# Patient Record
Sex: FEMALE | Race: AMERICAN INDIAN OR ALASKA NATIVE
[De-identification: names, ages, dates, MRNs, and addresses within clinical notes are randomized per-mention and may not be internally consistent; named-entity substitution may affect disease eponyms.]

---

## 2021-04-21 ENCOUNTER — HOSPITAL ENCOUNTER (EMERGENCY)
Dept: HOSPITAL 5 - ED | Age: 66
Discharge: HOME | End: 2021-04-21
Payer: MEDICARE

## 2021-04-21 VITALS — SYSTOLIC BLOOD PRESSURE: 120 MMHG | DIASTOLIC BLOOD PRESSURE: 72 MMHG

## 2021-04-21 DIAGNOSIS — Z79.899: ICD-10-CM

## 2021-04-21 DIAGNOSIS — E11.9: ICD-10-CM

## 2021-04-21 DIAGNOSIS — I10: ICD-10-CM

## 2021-04-21 DIAGNOSIS — M19.90: ICD-10-CM

## 2021-04-21 DIAGNOSIS — K21.00: Primary | ICD-10-CM

## 2021-04-21 DIAGNOSIS — Z98.890: ICD-10-CM

## 2021-04-21 DIAGNOSIS — Z87.891: ICD-10-CM

## 2021-04-21 DIAGNOSIS — R07.89: ICD-10-CM

## 2021-04-21 DIAGNOSIS — K04.7: ICD-10-CM

## 2021-04-21 LAB
ALBUMIN SERPL-MCNC: 3.6 G/DL (ref 3.9–5)
ALT SERPL-CCNC: 23 UNITS/L (ref 7–56)
APTT BLD: 24.4 SEC. (ref 24.2–36.6)
BASOPHILS # (AUTO): 0 K/MM3 (ref 0–0.1)
BASOPHILS NFR BLD AUTO: 0.7 % (ref 0–1.8)
BILIRUB UR QL STRIP: (no result)
BLOOD UR QL VISUAL: (no result)
BUN SERPL-MCNC: 10 MG/DL (ref 7–17)
BUN/CREAT SERPL: 11 %
CALCIUM SERPL-MCNC: 8.9 MG/DL (ref 8.4–10.2)
EOSINOPHIL # BLD AUTO: 0.1 K/MM3 (ref 0–0.4)
EOSINOPHIL NFR BLD AUTO: 1.1 % (ref 0–4.3)
HCT VFR BLD CALC: 46.6 % (ref 30.3–42.9)
HEMOLYSIS INDEX: 14
HGB BLD-MCNC: 15.1 GM/DL (ref 10.1–14.3)
INR PPP: 0.92 (ref 0.87–1.13)
LYMPHOCYTES # BLD AUTO: 0.7 K/MM3 (ref 1.2–5.4)
LYMPHOCYTES NFR BLD AUTO: 11.7 % (ref 13.4–35)
MCHC RBC AUTO-ENTMCNC: 32 % (ref 30–34)
MCV RBC AUTO: 90 FL (ref 79–97)
MONOCYTES # (AUTO): 0.6 K/MM3 (ref 0–0.8)
MONOCYTES % (AUTO): 10.7 % (ref 0–7.3)
MUCOUS THREADS #/AREA URNS HPF: (no result) /HPF
PH UR STRIP: 5 [PH] (ref 5–7)
PLATELET # BLD: 230 K/MM3 (ref 140–440)
PROT UR STRIP-MCNC: (no result) MG/DL
RBC # BLD AUTO: 5.2 M/MM3 (ref 3.65–5.03)
RBC #/AREA URNS HPF: 1 /HPF (ref 0–6)
UROBILINOGEN UR-MCNC: < 2 MG/DL (ref ?–2)
WBC #/AREA URNS HPF: 2 /HPF (ref 0–6)

## 2021-04-21 PROCEDURE — 36415 COLL VENOUS BLD VENIPUNCTURE: CPT

## 2021-04-21 PROCEDURE — 85610 PROTHROMBIN TIME: CPT

## 2021-04-21 PROCEDURE — 84484 ASSAY OF TROPONIN QUANT: CPT

## 2021-04-21 PROCEDURE — 85730 THROMBOPLASTIN TIME PARTIAL: CPT

## 2021-04-21 PROCEDURE — 85025 COMPLETE CBC W/AUTO DIFF WBC: CPT

## 2021-04-21 PROCEDURE — 93005 ELECTROCARDIOGRAM TRACING: CPT

## 2021-04-21 PROCEDURE — 80053 COMPREHEN METABOLIC PANEL: CPT

## 2021-04-21 PROCEDURE — 71046 X-RAY EXAM CHEST 2 VIEWS: CPT

## 2021-04-21 PROCEDURE — 81001 URINALYSIS AUTO W/SCOPE: CPT

## 2021-04-21 NOTE — XRAY REPORT
CHEST 2 VIEWS 



INDICATION: 

Chest Pain. 



COMPARISON: 





FINDINGS:

Support devices: None.



Heart: Within normal limits.

Lungs: No acute air space or interstitial disease.

Pleura: No significant pleural effusion. No pneumothorax.



Additional findings: None.



IMPRESSION:

1. No acute findings.



Signer Name: Cameron Neff MD 

Signed: 4/21/2021 1:24 PM

Workstation Name: [id number]

## 2021-04-21 NOTE — EMERGENCY DEPARTMENT REPORT
ED Chest Pain HPI





- General


Chief Complaint: Chest Pain


Stated Complaint: CHEST PAIN/CLAUDETTE


PUI?: No


Time Seen by Provider: 04/21/21 12:26


Source: patient, EMS


Mode of arrival: Wheelchair


Limitations: Physical Limitation





- History of Present Illness


Initial Comments: 





CC: Chest pain, facial swelling





HPI: This is a 66-year-old female with history of hypertension, tobacco 

dependence, GERD, arthritis who presents with chest pain in facial swelling.  

Her PCP was concerned for heart disease.  PCP referred her to [facility].  She was

admitted overnight.  She did not have any acute findings according to her 

report.  She stated that "they did not find anything wrong".  She was discharged

on yesterday morning.  She awakened with left jaw swelling.  She has some 

tenderness.  She does not have teeth in that region.  She normally wears a 

partial.





She has sharp episodic pain in her chest at rest.  No radiation.  No exertional 

with eating or exertion.





No fever.  No difficulty with swallowing.


MD Complaint: chest pain


-: Gradual (3 days), days(s) (3 days)


Onset: during rest


Pain Location: substernal


Pain Radiation: none


Severity: moderate


Severity scale (0 -10): 7


Quality: sharp


Consistency: intermittent


Improves With: nitroglycerin


Worsens With: nothing


Context: recent illness


Other Symptoms: other (Left jaw facial swelling)





- Related Data


                                  Previous Rx's











 Medication  Instructions  Recorded  Last Taken  Type


 


traMADoL [Ultram 50 MG tab] 50 mg PO Q6HR PRN #14 tablet 05/24/16 Unknown Rx


 


Clindamycin [Clindamycin CAP] 300 mg PO TID 7 Days #21 cap 04/21/21 Unknown Rx


 


HYDROcodone/APAP 5-325 [Sturgeon 1 each PO Q6HR PRN #15 tablet 04/21/21 Unknown Rx





5/325]    











                                    Allergies











Allergy/AdvReac Type Severity Reaction Status Date / Time


 


No Known Allergies Allergy   Verified 04/21/21 12:32














Heart Score





- HEART Score


History: Slightly suspicious


EKG: Non-specific


Age: > 65


Risk factors: 1-2 risk factors


Troponin: < normal limit


HEART Score: 4





- EKG Read Time


Time EKG Completed: 12:27


EKG Read Time: 12:30





ED Review of Systems


ROS: 


Stated complaint: CHEST PAIN/CLAUDETTE


Other details as noted in HPI





Comment: All other systems reviewed and negative


Constitutional: denies: fever, malaise


Respiratory: shortness of breath.  denies: cough


Cardiovascular: chest pain





ED Past Medical Hx





- Past Medical History


Previous Medical History?: Yes


Hx Hypertension: Yes


Hx Diabetes: Yes





- Surgical History


Past Surgical History?: Yes


Additional Surgical History: hernia repair





- Social History


Smoking Status: Former Smoker


Substance Use Type: None





- Medications


Home Medications: 


                                Home Medications











 Medication  Instructions  Recorded  Confirmed  Last Taken  Type


 


traMADoL [Ultram 50 MG tab] 50 mg PO Q6HR PRN #14 tablet 05/24/16  Unknown Rx


 


Clindamycin [Clindamycin CAP] 300 mg PO TID 7 Days #21 cap 04/21/21  Unknown Rx


 


HYDROcodone/APAP 5-325 [Sturgeon 1 each PO Q6HR PRN #15 tablet 04/21/21  Unknown Rx





5/325]     














ED Physical Exam





- General


Limitations: Physical Limitation


General appearance: alert, in no apparent distress





- Head


Head exam: Present: atraumatic, normocephalic





- Eye


Eye exam: Present: normal appearance





- ENT


ENT exam: Present: other (Left jaw swelling there is left chin tenderness of the

left gumline minimal edema)





- Neck


Neck exam: Present: normal inspection





- Respiratory


Respiratory exam: Present: normal lung sounds bilaterally.  Absent: respiratory 

distress, wheezes, rales, rhonchi





- Cardiovascular


Cardiovascular Exam: Present: regular rate, normal rhythm, normal heart sounds. 

Absent: systolic murmur, diastolic murmur, rubs, gallop





- GI/Abdominal


GI/Abdominal exam: Present: soft, normal bowel sounds.  Absent: distended, 

tenderness, guarding, rebound





- Extremities Exam


Extremities exam: Present: normal inspection





- Neurological Exam


Neurological exam: Present: alert, oriented X3





- Psychiatric


Psychiatric exam: Present: normal affect, normal mood





- Skin


Skin exam: Present: warm, dry, intact, normal color.  Absent: rash





ED Medical Decision Making





- Lab Data


Result diagrams: 


                                 04/21/21 13:27





                                 04/21/21 13:27








                         Laboratory Results - last 24 hr











  04/21/21 04/21/21 04/21/21





  13:27 13:27 13:27


 


WBC  5.7  


 


RBC  5.20 H  


 


Hgb  15.1 H  


 


Hct  46.6 H  


 


MCV  90  


 


MCH  29  


 


MCHC  32  


 


RDW  15.6 H  


 


Plt Count  230  


 


Lymph % (Auto)  11.7 L  


 


Mono % (Auto)  10.7 H  


 


Eos % (Auto)  1.1  


 


Baso % (Auto)  0.7  


 


Lymph # (Auto)  0.7 L  


 


Mono # (Auto)  0.6  


 


Eos # (Auto)  0.1  


 


Baso # (Auto)  0.0  


 


Seg Neutrophils %  75.8 H  


 


Seg Neutrophils #  4.3  


 


PT   12.3 


 


INR   0.92 


 


APTT   24.4 


 


Sodium    137


 


Potassium    3.9


 


Chloride    102.6


 


Carbon Dioxide    25


 


Anion Gap    13


 


BUN    10


 


Creatinine    0.9


 


Estimated GFR    > 60


 


BUN/Creatinine Ratio    11


 


Glucose    95


 


Calcium    8.9


 


Total Bilirubin    0.30


 


AST    21


 


ALT    23


 


Alkaline Phosphatase    98


 


Troponin T    < 0.010


 


Total Protein    7.0


 


Albumin    3.6 L


 


Albumin/Globulin Ratio    1.1


 


Urine Color   


 


Urine Turbidity   


 


Urine pH   


 


Ur Specific Gravity   


 


Urine Protein   


 


Urine Glucose (UA)   


 


Urine Ketones   


 


Urine Blood   


 


Urine Nitrite   


 


Urine Bilirubin   


 


Urine Urobilinogen   


 


Ur Leukocyte Esterase   


 


Urine WBC (Auto)   


 


Urine RBC (Auto)   


 


U Epithel Cells (Auto)   


 


Urine Mucus   














  04/21/21 04/21/21





  16:08 16:12


 


WBC  


 


RBC  


 


Hgb  


 


Hct  


 


MCV  


 


MCH  


 


MCHC  


 


RDW  


 


Plt Count  


 


Lymph % (Auto)  


 


Mono % (Auto)  


 


Eos % (Auto)  


 


Baso % (Auto)  


 


Lymph # (Auto)  


 


Mono # (Auto)  


 


Eos # (Auto)  


 


Baso # (Auto)  


 


Seg Neutrophils %  


 


Seg Neutrophils #  


 


PT  


 


INR  


 


APTT  


 


Sodium  


 


Potassium  


 


Chloride  


 


Carbon Dioxide  


 


Anion Gap  


 


BUN  


 


Creatinine  


 


Estimated GFR  


 


BUN/Creatinine Ratio  


 


Glucose  


 


Calcium  


 


Total Bilirubin  


 


AST  


 


ALT  


 


Alkaline Phosphatase  


 


Troponin T   < 0.010


 


Total Protein  


 


Albumin  


 


Albumin/Globulin Ratio  


 


Urine Color  Yellow 


 


Urine Turbidity  Slightly-cloudy 


 


Urine pH  5.0 


 


Ur Specific [place]  1.011 


 


Urine Protein  <15 mg/dl 


 


Urine Glucose (UA)  Neg 


 


Urine Ketones  Neg 


 


Urine Blood  Neg 


 


Urine Nitrite  Neg 


 


Urine Bilirubin  Neg 


 


Urine Urobilinogen  < 2.0 


 


Ur Leukocyte Esterase  Neg 


 


Urine WBC (Auto)  2.0 


 


Urine RBC (Auto)  1.0 


 


U Epithel Cells (Auto)  6.0 


 


Urine Mucus  3+ 














- EKG Data


-: EKG Interpreted by Me


EKG shows normal: sinus rhythm, intervals, QRS complexes, ST-T waves


Rate: normal





- EKG Data





04/21/21 18:13


EKG obtained 1227


EKG interpreted by me


Normal sinus rhythm rate 95 bpm left axis deviation no ST elevation nonischemic 

T wave pattern





- Radiology Data


Radiology results: report reviewed





Chest radiograph 2 views: No acute findings





- Medical Decision Making





1.  Chest pain: Atypical for ACS.  No persistent pain to indicate pulmonary 

embolism.  Recent hospitalization at [facility].  Unclear if she received appropriate

restratification with cardiac stress testing.  Heart score 4.  However I do not 

feel that this represents acute cardiac event.  Troponin x2 negatve.  I have 

faxed cardiology referral request to [facility] for outpatient 

follow-up.  I do not detect acute emergent condition which would explain her 

pain.





2.  Dental infection: Prescribed clindamycin, Norco for pain.  Recommend 

outpatient dental evaluation.


Critical care attestation.: 


If time is entered above; I have spent that time in minutes in the direct care 

of this critically ill patient, excluding procedure time.








ED Disposition


Clinical Impression: 


 GERD with esophagitis, Chest wall pain, Dental infection





Disposition: DC-01 TO HOME OR SELFCARE


Is pt being admited?: No


Does the pt Need Aspirin: No


Condition: Stable


Instructions:  Nonspecific Chest Pain, Adult


Prescriptions: 


Clindamycin [Clindamycin CAP] 300 mg PO TID 7 Days #21 cap


HYDROcodone/APAP 5-325 [Sturgeon 5/325] 1 each PO Q6HR PRN #15 tablet


 PRN Reason: Pain


Referrals: 


NAYLA BASILIO MD [Staff Physician] - 3-5 Days

## 2021-04-21 NOTE — EVENT NOTE
ED Screening Note


ED Screening Note: 








66-year-old female that presents with cp and sob.





This initial assessment/diagnostic orders/clinical plan/treatment(s) is/are 

subject to change based on patients health status, clinical progression and re-

assessment by fellow clinical providers in the ED. Further treatment and workup 

at subsequent clinical providers discretion. Patient/guardian urged not to elope

from the ED as their condition may be serious if not clinically assessed and 

managed. 





Initial orders include: 


cardiac workup

## 2021-04-22 NOTE — ELECTROCARDIOGRAPH REPORT
[facility]

                                       

Test Date:    2021               Test Time:    12:27:12

Pat Name:     AYUSH MEADE          Department:   

Patient ID:   [id number]          Room:          

Gender:       F                        Technician:   TV

:          [id number]               Requested By: ZULEMA CHURCH

Order Number: [id number]              Reading MD:   Nate Weaver

                                 Measurements

Intervals                              Axis          

Rate:         94                       P:            58

MA:           204                      QRS:          -72

QRSD:         77                       T:            63

QT:           362                                    

QTc:          453                                    

                           Interpretive Statements

Sinus rhythm

LAE, consider biatrial enlargement

Anterolateral infarct, age indeterminate

No previous ECG available for comparison

Electronically Signed On 2021 9:37:03 EDT by Nate Weaver

## 2023-01-06 ENCOUNTER — LAB OUTSIDE AN ENCOUNTER (OUTPATIENT)
Dept: URBAN - METROPOLITAN AREA CLINIC 109 | Facility: CLINIC | Age: 68
End: 2023-01-06

## 2023-01-06 ENCOUNTER — OFFICE VISIT (OUTPATIENT)
Dept: URBAN - METROPOLITAN AREA CLINIC 109 | Facility: CLINIC | Age: 68
End: 2023-01-06
Payer: MEDICARE

## 2023-01-06 ENCOUNTER — DASHBOARD ENCOUNTERS (OUTPATIENT)
Age: 68
End: 2023-01-06

## 2023-01-06 ENCOUNTER — WEB ENCOUNTER (OUTPATIENT)
Dept: URBAN - METROPOLITAN AREA CLINIC 109 | Facility: CLINIC | Age: 68
End: 2023-01-06

## 2023-01-06 VITALS
WEIGHT: 134.6 LBS | HEIGHT: 64 IN | SYSTOLIC BLOOD PRESSURE: 104 MMHG | HEART RATE: 81 BPM | DIASTOLIC BLOOD PRESSURE: 67 MMHG | TEMPERATURE: 97.2 F | BODY MASS INDEX: 22.98 KG/M2

## 2023-01-06 DIAGNOSIS — R63.4 WEIGHT LOSS: ICD-10-CM

## 2023-01-06 DIAGNOSIS — R68.81 EARLY SATIETY: ICD-10-CM

## 2023-01-06 PROBLEM — 64379006: Status: ACTIVE | Noted: 2023-01-06

## 2023-01-06 PROBLEM — 711150003: Status: ACTIVE | Noted: 2023-01-06

## 2023-01-06 PROBLEM — 69896004: Status: ACTIVE | Noted: 2023-01-06

## 2023-01-06 PROBLEM — 13645005: Status: ACTIVE | Noted: 2023-01-06

## 2023-01-06 PROCEDURE — 99244 OFF/OP CNSLTJ NEW/EST MOD 40: CPT

## 2023-01-06 PROCEDURE — 99244 OFF/OP CNSLTJ NEW/EST MOD 40: CPT | Performed by: INTERNAL MEDICINE

## 2023-01-06 PROCEDURE — 99204 OFFICE O/P NEW MOD 45 MIN: CPT | Performed by: INTERNAL MEDICINE

## 2023-01-06 PROCEDURE — 99204 OFFICE O/P NEW MOD 45 MIN: CPT

## 2023-01-06 RX ORDER — DICLOFENAC SODIUM 10 MG/G
AS DIRECTED GEL TOPICAL
Status: ACTIVE | COMMUNITY

## 2023-01-06 NOTE — HPI-TODAY'S VISIT:
Mr. Hobbs is a 68 y/o F who was referred to us by Dr. Blanc for abnormal wt loss and a screening colonoscopy. A copy of this OV will be sent to referring provider. Pt states that over the past x4 years she has gone from 187lbs to 134lbs. Reports early satiety and decreased appetite, denies N/V. Denies previous work up for these symtpoms. Denies previous colonoscopy or EGD or FMHx of CRC. Denies changes in bowel habits, overt GI bleeding, abd pain. Denies heart issues. Reports COPD and SOB with exeretion but denies use of home O2. Reports hernia repair followed by a blood clot and need for anticoagulation. Denies use of wt loss medications.

## 2023-01-20 ENCOUNTER — LAB OUTSIDE AN ENCOUNTER (OUTPATIENT)
Dept: URBAN - METROPOLITAN AREA CLINIC 118 | Facility: CLINIC | Age: 68
End: 2023-01-20

## 2023-01-20 ENCOUNTER — LAB OUTSIDE AN ENCOUNTER (OUTPATIENT)
Dept: URBAN - METROPOLITAN AREA CLINIC 109 | Facility: CLINIC | Age: 68
End: 2023-01-20

## 2023-01-22 LAB
CREATININE POC: 0.5
PERFORMING LAB: (no result)

## 2023-02-10 ENCOUNTER — OFFICE VISIT (OUTPATIENT)
Dept: URBAN - METROPOLITAN AREA CLINIC 109 | Facility: CLINIC | Age: 68
End: 2023-02-10

## 2023-02-10 RX ORDER — DICLOFENAC SODIUM 10 MG/G
AS DIRECTED GEL TOPICAL
COMMUNITY

## 2023-02-22 ENCOUNTER — OFFICE VISIT (OUTPATIENT)
Dept: URBAN - METROPOLITAN AREA MEDICAL CENTER 16 | Facility: MEDICAL CENTER | Age: 68
End: 2023-02-22

## 2023-04-21 ENCOUNTER — TELEPHONE ENCOUNTER (OUTPATIENT)
Dept: URBAN - METROPOLITAN AREA CLINIC 118 | Facility: CLINIC | Age: 68
End: 2023-04-21

## 2023-04-24 PROBLEM — 442076002: Status: ACTIVE | Noted: 2023-04-24

## 2023-06-28 ENCOUNTER — OFFICE VISIT (OUTPATIENT)
Dept: URBAN - METROPOLITAN AREA MEDICAL CENTER 16 | Facility: MEDICAL CENTER | Age: 68
End: 2023-06-28
Payer: MEDICARE

## 2023-06-28 DIAGNOSIS — D12.3 ADENOMA OF TRANSVERSE COLON: ICD-10-CM

## 2023-06-28 DIAGNOSIS — Z12.11 COLON CANCER SCREENING: ICD-10-CM

## 2023-06-28 DIAGNOSIS — K31.89 ACQUIRED DEFORMITY OF DUODENUM: ICD-10-CM

## 2023-06-28 DIAGNOSIS — R63.4 ABNORMAL INTENTIONAL WEIGHT LOSS: ICD-10-CM

## 2023-06-28 PROCEDURE — 45385 COLONOSCOPY W/LESION REMOVAL: CPT | Performed by: INTERNAL MEDICINE

## 2023-06-28 PROCEDURE — 43239 EGD BIOPSY SINGLE/MULTIPLE: CPT | Performed by: INTERNAL MEDICINE

## 2023-09-19 ENCOUNTER — OFFICE VISIT (OUTPATIENT)
Dept: URBAN - METROPOLITAN AREA CLINIC 118 | Facility: CLINIC | Age: 68
End: 2023-09-19